# Patient Record
Sex: FEMALE | Race: WHITE
[De-identification: names, ages, dates, MRNs, and addresses within clinical notes are randomized per-mention and may not be internally consistent; named-entity substitution may affect disease eponyms.]

---

## 2019-02-14 ENCOUNTER — HOSPITAL ENCOUNTER (OUTPATIENT)
Dept: HOSPITAL 53 - M SDC | Age: 39
Discharge: HOME | End: 2019-02-14
Attending: UROLOGY
Payer: COMMERCIAL

## 2019-02-14 VITALS — DIASTOLIC BLOOD PRESSURE: 71 MMHG | SYSTOLIC BLOOD PRESSURE: 138 MMHG

## 2019-02-14 VITALS — WEIGHT: 201 LBS | HEIGHT: 65 IN | BODY MASS INDEX: 33.49 KG/M2

## 2019-02-14 DIAGNOSIS — N73.6: ICD-10-CM

## 2019-02-14 DIAGNOSIS — Z30.2: ICD-10-CM

## 2019-02-14 DIAGNOSIS — R10.2: Primary | ICD-10-CM

## 2019-02-14 DIAGNOSIS — N93.9: ICD-10-CM

## 2019-02-14 LAB — HCG UR QL: NEGATIVE

## 2019-02-14 PROCEDURE — 58356 ENDOMETRIAL CRYOABLATION: CPT

## 2019-02-14 PROCEDURE — 58670 LAPAROSCOPY TUBAL CAUTERY: CPT

## 2019-02-14 PROCEDURE — 84703 CHORIONIC GONADOTROPIN ASSAY: CPT

## 2019-02-14 PROCEDURE — 88305 TISSUE EXAM BY PATHOLOGIST: CPT

## 2019-02-14 RX ADMIN — FENTANYL CITRATE PRN MCG: 50 INJECTION, SOLUTION INTRAMUSCULAR; INTRAVENOUS at 10:21

## 2019-02-14 RX ADMIN — FENTANYL CITRATE PRN MCG: 50 INJECTION, SOLUTION INTRAMUSCULAR; INTRAVENOUS at 10:29

## 2019-02-14 RX ADMIN — FENTANYL CITRATE PRN MCG: 50 INJECTION, SOLUTION INTRAMUSCULAR; INTRAVENOUS at 10:11

## 2019-02-14 RX ADMIN — FENTANYL CITRATE PRN MCG: 50 INJECTION, SOLUTION INTRAMUSCULAR; INTRAVENOUS at 10:16

## 2019-02-16 NOTE — RO
DATE OF PROCEDURE:  2019

 

PREOPERATIVE DIAGNOSES/INDICATION FOR SURGERY:

Pain, bleeding, desire for tubal ligation.

 

POSTOPERATIVE DIAGNOSES:

Pain, bleeding, desire for tubal ligation.

Extensive peritoneal adhesions, likely but not definitively a result of her

 section.

 

PROCEDURE:  Laparoscopic bilateral tubal ligation with dilatation and curettage,

hysteroscopy NovaSure endometrial ablation.

 

SURGEON:  Leona Mansfield MD

 

ASSISTANT:

 

ANESTHESIA:  General endotracheal anesthesia.

 

BRIEF DESCRIPTION OF PROCEDURE AND FINDINGS:  Adore was brought to the

operating room where sufficient general endotracheal anesthesia was induced and

she was prepped, draped and positioned in the usual sterile fashion, the bladder

emptied and the anterior aspect of the cervix grasped with a single tooth

tenaculum.  The uterus was sounded to 9 with an endocervical length of 4 giving a

cavity length of 5, which was used later in the case.  At this point, we only had

length.  Width was eventually measured at 3.5, but at this point we just again

had length of 5.  Went ahead and placed the uterine manipulator and then turned

our attention to the abdomen.

 

A transverse semilunar incision was made at the base of the umbilicus, sharp and

blunt dissection were continued through the subcutaneous tissues to the level of

the rectus fascia, which was grasped with Kocher clamps, elevated, transversely

incised under direct visualization, secured with #0 Vicryl retention sutures and

then the peritoneum entered under direct visualization and the Hugh cannula

placed in an open laparoscopic technique.  It was secured in place with #0 Vicryl

retention sutures and CO2 insufflation then begun.

 

After adequate CO2 insufflation, the peritoneal cavity was visualized.  There

were normal shiny peritoneal surfaces throughout without excrescence, ascites nor

exudate, but there were extensive non-distorting peritoneal adhesions of the

pelvis, and some adhesions of the uterus to the anterior abdominal wall, some of

which would somewhat limit the space of the bladder.  They did not move the

uterus out of its normal location, but given that the bladder would be inferior

to some of these adhesions, there may be some limitation to its ability to fill

and the intestines were also somewhat adherent and the fallopian tubes somewhat

adherent to the utero-ovarian suspensory ligaments.  Pictures were taken do

document all of these findings.  We did not find any significant lesions.  Tubes

were not distorted by those minor adhesions and we were readily able to see the

fimbriated ends.  We then used the bipolar cautery to cauterize each tube in four

separate locations with complete cauterization with each effort and pictures were

taken to document this progress as well.  There were some minor adhesions of the

intestines along both left and right side, but again they were not really kinking

or distorting the intestine, particularly especially since she was in relatively

steep Trendelenburg and even with that there were no many spots where the

intestines were really kinked, so we did not do a lot of incising of these

because we did not know if we cut them whether they would heal in a better or

worse state, so they were left in place.  The upper abdomen is normal in

appearance and this portion of the procedure was then ended with the CO2 allowed

to escape the abdomen, the instruments removed, the umbilical wound closed at the

fascial level with the #0 Vicryl retention suture and the skin closed with #3-0

Vicryl in a subcuticular stitch and dry sterile dressing then applied.  Attention

was then turned to the pelvic.

 

With the uterine manipulator removed, the hysteroscope was placed.  We initially

had some air bubbles in the cavity and some posterior changes consistent with

placement of the uterine manipulator, but there were other no significant

findings within the endometrium.  Tubal ostia were normal in appearance.  The

shape of the cavity was normal.  The curettage was carried out and then the

NovaSure ablative device was placed, and again length was measured at 5 already,

width was measured at this point at 3.5 and an uncomplicated NovaSure ablation

was then carried out, and the procedure then ended.

 

ESTIMATED BLOOD LOSS FOR PROCEDURE:  About 3 mL.

 

FLUID REPLACEMENT:  Crystalloid.

 

COMPLICATIONS:  None.

 

CONDITION AND DISPOSITION:  Adore tolerated the procedure well and was

recovering in the recovery room in good condition.

## 2019-04-22 ENCOUNTER — HOSPITAL ENCOUNTER (EMERGENCY)
Dept: HOSPITAL 53 - M ED | Age: 39
LOS: 1 days | Discharge: HOME | End: 2019-04-23
Payer: COMMERCIAL

## 2019-04-22 VITALS — BODY MASS INDEX: 31.72 KG/M2 | WEIGHT: 190.39 LBS | HEIGHT: 65 IN

## 2019-04-22 DIAGNOSIS — Z87.440: ICD-10-CM

## 2019-04-22 DIAGNOSIS — N76.0: Primary | ICD-10-CM

## 2019-04-22 LAB
B-HCG SERPL QL: NEGATIVE
BASOPHILS # BLD AUTO: 0 10^3/UL (ref 0–0.2)
BASOPHILS NFR BLD AUTO: 0.3 % (ref 0–1)
BUN SERPL-MCNC: 15 MG/DL (ref 7–18)
CALCIUM SERPL-MCNC: 9.3 MG/DL (ref 8.5–10.1)
CHLORIDE SERPL-SCNC: 109 MEQ/L (ref 98–107)
CO2 SERPL-SCNC: 27 MEQ/L (ref 21–32)
CREAT SERPL-MCNC: 0.9 MG/DL (ref 0.55–1.3)
EOSINOPHIL # BLD AUTO: 0.2 10^3/UL (ref 0–0.5)
EOSINOPHIL NFR BLD AUTO: 1.7 % (ref 0–3)
GFR SERPL CREATININE-BSD FRML MDRD: > 60 ML/MIN/{1.73_M2} (ref 60–?)
GLUCOSE SERPL-MCNC: 79 MG/DL (ref 70–100)
HCT VFR BLD AUTO: 40.3 % (ref 36–47)
HGB BLD-MCNC: 14.2 G/DL (ref 12–15.5)
LYMPHOCYTES # BLD AUTO: 2.5 10^3/UL (ref 1.5–4.5)
LYMPHOCYTES NFR BLD AUTO: 28.3 % (ref 24–44)
MCH RBC QN AUTO: 29.9 PG (ref 27–33)
MCHC RBC AUTO-ENTMCNC: 35.2 G/DL (ref 32–36.5)
MCV RBC AUTO: 84.8 FL (ref 80–96)
MONOCYTES # BLD AUTO: 0.8 10^3/UL (ref 0–0.8)
MONOCYTES NFR BLD AUTO: 9.3 % (ref 0–5)
NEUTROPHILS # BLD AUTO: 5.2 10^3/UL (ref 1.8–7.7)
NEUTROPHILS NFR BLD AUTO: 60.2 % (ref 36–66)
PLATELET # BLD AUTO: 232 10^3/UL (ref 150–450)
POTASSIUM SERPL-SCNC: 4 MEQ/L (ref 3.5–5.1)
RBC # BLD AUTO: 4.75 10^6/UL (ref 4–5.4)
SODIUM SERPL-SCNC: 141 MEQ/L (ref 136–145)
WBC # BLD AUTO: 8.7 10^3/UL (ref 4–10)

## 2019-04-22 PROCEDURE — 84703 CHORIONIC GONADOTROPIN ASSAY: CPT

## 2019-04-22 PROCEDURE — 93976 VASCULAR STUDY: CPT

## 2019-04-22 PROCEDURE — 99284 EMERGENCY DEPT VISIT MOD MDM: CPT

## 2019-04-22 PROCEDURE — 81001 URINALYSIS AUTO W/SCOPE: CPT

## 2019-04-22 PROCEDURE — 86901 BLOOD TYPING SEROLOGIC RH(D): CPT

## 2019-04-22 PROCEDURE — 87491 CHLMYD TRACH DNA AMP PROBE: CPT

## 2019-04-22 PROCEDURE — 76856 US EXAM PELVIC COMPLETE: CPT

## 2019-04-22 PROCEDURE — 87210 SMEAR WET MOUNT SALINE/INK: CPT

## 2019-04-22 PROCEDURE — 96372 THER/PROPH/DIAG INJ SC/IM: CPT

## 2019-04-22 PROCEDURE — 87591 N.GONORRHOEAE DNA AMP PROB: CPT

## 2019-04-22 PROCEDURE — 86900 BLOOD TYPING SEROLOGIC ABO: CPT

## 2019-04-22 PROCEDURE — 85025 COMPLETE CBC W/AUTO DIFF WBC: CPT

## 2019-04-22 PROCEDURE — 87086 URINE CULTURE/COLONY COUNT: CPT

## 2019-04-22 PROCEDURE — 36415 COLL VENOUS BLD VENIPUNCTURE: CPT

## 2019-04-22 PROCEDURE — 86850 RBC ANTIBODY SCREEN: CPT

## 2019-04-22 PROCEDURE — 80048 BASIC METABOLIC PNL TOTAL CA: CPT

## 2019-04-23 VITALS — SYSTOLIC BLOOD PRESSURE: 130 MMHG | DIASTOLIC BLOOD PRESSURE: 62 MMHG

## 2019-04-23 LAB
CHLAMYDIA DNA AMPLIFICATION: NEGATIVE
N GONORRHOEA RRNA SPEC QL NAA+PROBE: NEGATIVE

## 2019-04-23 NOTE — REPVR
EXAM: 

 US Pelvis Complete, Transabdominal 



EXAM DATE/TIME: 

 4/23/2019 12:34 AM 



CLINICAL HISTORY: 

 38 years old, female; Pelvic pain; Prior surgery; Surgery date: 1-6 months; 

Surgery type: Ablation and tubal; Additional info: Pelvic pain, R/O ov cyst/ 

torsion 



TECHNIQUE: 

 Imaging protocol: Real-time transabdominal pelvic ultrasound with image 

documentation. Complete exam. 



COMPARISON: 

 No relevant prior studies available. 



FINDINGS: 

 Uterus/cervix: Uterus measures 8.9 x 4.0 x 5.2 cm. Endometrial thickness is 

measuring 4.6 mm. 

 Right adnexa: Right ovary measures 3.9 x 1.8 x 2.8 cm. Normal vascular flow is 

seen. No evidence of cyst or mass. 

 Left adnexa: Left ovary measures 3.6 x 2.4 x 2.4 cm. Normal vascular flow is 

seen. No evidence of cyst or mass. 

 Free fluid: None. 

 Bladder: Urinary bladder is unremarkable measuring 4.9 x 3.0 x 5.2 cm. 



IMPRESSION: 

Negative exam.



Electronically signed by: Merary Gerardo On 04/23/2019  01:31:49 AM